# Patient Record
Sex: FEMALE | Race: WHITE | URBAN - METROPOLITAN AREA
[De-identification: names, ages, dates, MRNs, and addresses within clinical notes are randomized per-mention and may not be internally consistent; named-entity substitution may affect disease eponyms.]

---

## 2017-11-15 DIAGNOSIS — F41.1 GAD (GENERALIZED ANXIETY DISORDER): ICD-10-CM

## 2017-11-15 RX ORDER — SERTRALINE HYDROCHLORIDE 50 MG/1
50 TABLET, FILM COATED ORAL DAILY
Qty: 60 TAB | Refills: 0 | Status: SHIPPED | OUTPATIENT
Start: 2017-11-15 | End: 2018-01-04 | Stop reason: SDUPTHER

## 2017-11-15 RX ORDER — SERTRALINE HYDROCHLORIDE 50 MG/1
50 TABLET, FILM COATED ORAL DAILY
Qty: 60 TAB | Refills: 0 | Status: SHIPPED | OUTPATIENT
Start: 2017-11-15 | End: 2017-11-15 | Stop reason: SDUPTHER

## 2017-11-15 NOTE — TELEPHONE ENCOUNTER
From: Alcides Swenson  To: Park Díaz DO  Sent: 11/15/2017 10:29 AM EST  Subject: Medication Renewal Request    Original authorizing provider: DO Alcides Rose would like a refill of the following medications:  sertraline (ZOLOFT) 50 mg tablet Park Díaz DO]    Preferred pharmacy: [place] [id number]     Comment:  Sertraline 50 mg 1 1/2 tablets daily

## 2018-01-04 ENCOUNTER — OFFICE VISIT (OUTPATIENT)
Dept: FAMILY MEDICINE CLINIC | Age: 28
End: 2018-01-04

## 2018-01-04 VITALS
HEIGHT: 64 IN | WEIGHT: 144.4 LBS | BODY MASS INDEX: 24.65 KG/M2 | DIASTOLIC BLOOD PRESSURE: 68 MMHG | OXYGEN SATURATION: 99 % | TEMPERATURE: 96.3 F | RESPIRATION RATE: 14 BRPM | SYSTOLIC BLOOD PRESSURE: 106 MMHG | HEART RATE: 77 BPM

## 2018-01-04 DIAGNOSIS — Z23 ENCOUNTER FOR IMMUNIZATION: ICD-10-CM

## 2018-01-04 DIAGNOSIS — Z13.220 LIPID SCREENING: ICD-10-CM

## 2018-01-04 DIAGNOSIS — Z00.00 PREVENTATIVE HEALTH CARE: Primary | ICD-10-CM

## 2018-01-04 DIAGNOSIS — F41.9 CHRONIC ANXIETY: ICD-10-CM

## 2018-01-04 DIAGNOSIS — F41.1 GAD (GENERALIZED ANXIETY DISORDER): ICD-10-CM

## 2018-01-04 DIAGNOSIS — F33.40 RECURRENT DEPRESSIVE DISORDER, CURRENTLY IN REMISSION (HCC): ICD-10-CM

## 2018-01-04 PROBLEM — F33.9 RECURRENT DEPRESSION (HCC): Status: RESOLVED | Noted: 2018-01-04 | Resolved: 2018-01-04

## 2018-01-04 PROBLEM — G44.229 CHRONIC TENSION-TYPE HEADACHE, NOT INTRACTABLE: Status: ACTIVE | Noted: 2018-01-04

## 2018-01-04 PROBLEM — F33.9 RECURRENT DEPRESSION (HCC): Status: ACTIVE | Noted: 2018-01-04

## 2018-01-04 RX ORDER — SERTRALINE HYDROCHLORIDE 50 MG/1
50 TABLET, FILM COATED ORAL DAILY
Qty: 90 TAB | Refills: 4 | Status: SHIPPED | OUTPATIENT
Start: 2018-01-04 | End: 2018-03-14 | Stop reason: DRUGHIGH

## 2018-01-04 NOTE — PATIENT INSTRUCTIONS

## 2018-01-04 NOTE — PROGRESS NOTES
1. Have you been to the ER, urgent care clinic since your last visit? Hospitalized since your last visit? No    2. Have you seen or consulted any other health care providers outside of the [address] [place] [address] since your last visit? Include any pap smears or colon screening. Chiropractor-Avtar Prince-about twice a week- to help with shoulder and neck pain. Counselor Robert Jorge 1/3/2018. GYN Dr. Riya Bolden with [facility]- pap smear done a couple of months ago and it was normal.     Chief Complaint   Patient presents with    Complete Physical     Not fasting      Patient presents for routine immunizations. Patient denies any symptoms , reactions or allergies that would exclude them from being immunized today. After obtaining written consent, and per verbal orders of Dr. Aden Christensen, injection of influenza given. Risks and adverse reactions were discussed and the VIS was given to them. All questions were addressed. Patient was observed for 15 minutes post injection. There were no reactions observed at this time.  Advised patient to call with any concerns or signs and symptoms of adverse reaction.      Richie Ricardo LPN

## 2018-01-04 NOTE — MR AVS SNAPSHOT
Visit Information Date & Time Provider Department Dept. Phone Encounter #  
 1/4/2018  1:00 PM Richard Wang [facility] [phone] [id number] Follow-up Instructions Return in about 1 year (around 1/4/2019) for Full Physical.  
  
Upcoming Health Maintenance Date Due  
 PAP AKA CERVICAL CYTOLOGY 3/26/2018 DTaP/Tdap/Td series (3 - Td) 4/14/2027 Allergies as of 1/4/2018  Review Complete On: 1/4/2018 By: Richard Wang MD  
 No Known Allergies Current Immunizations  Reviewed on 12/29/2017 Name Date HPV 1/1/2007 Hep B Vaccine 1/1/2001 Influenza Vaccine (Quad) PF  Incomplete MMR 1/1/1994 Poliovirus vaccine 1/1/1993 Td 1/1/2006 Tdap 4/14/2017 Not reviewed this visit You Were Diagnosed With   
  
 Codes Comments Preventative health care    -  Primary ICD-10-CM: Z00.00 ICD-9-CM: V70.0 Lipid screening     ICD-10-CM: I38.960 ICD-9-CM: V77.91 Chronic anxiety     ICD-10-CM: F41.9 ICD-9-CM: 300.00 Recurrent depressive disorder, currently in remission [facility])     ICD-10-CM: F33.40 ICD-9-CM: 296.35 Encounter for immunization     ICD-10-CM: D26 ICD-9-CM: V03.89 JAY (generalized anxiety disorder)     ICD-10-CM: F41.1 ICD-9-CM: 300.02 Vitals BP Pulse Temp Resp Height(growth percentile) Weight(growth percentile) 106/68 (BP 1 Location: Left arm, BP Patient Position: Sitting) 77 96.3 °F (35.7 °C) (Oral) 14 5' 4\" (1.626 m) 144 lb 6.4 oz (65.5 kg) LMP SpO2 BMI OB Status Smoking Status 12/27/2017 99% 24.79 kg/m2 Having regular periods Never Smoker Vitals History BMI and BSA Data Body Mass Index Body Surface Area 24.79 kg/m 2 1.72 m 2 Preferred Pharmacy Pharmacy Name Phone [id number] [place] [address] [phone] Your Updated Medication List  
  
   
 This list is accurate as of: 1/4/18  1:43 PM.  Always use your most recent med list. ADVIL PO Take  by mouth. PRENATAL 19 PO Take  by mouth daily. sertraline 50 mg tablet Commonly known as:  ZOLOFT Take 1 Tab by mouth daily. Prescriptions Sent to Pharmacy Refills  
 sertraline (ZOLOFT) 50 mg tablet 4 Sig: Take 1 Tab by mouth daily. Class: Normal  
 Pharmacy: [id number]- [place], [address] Ph #: [phone] Route: Oral  
  
We Performed the Following INFLUENZA VIRUS VAC QUAD,SPLIT,PRESV FREE SYRINGE IM [id number] CPT(R)] LIPID PANEL [04016 CPT(R)] TSH 3RD GENERATION [62357 CPT(R)] Follow-up Instructions Return in about 1 year (around 1/4/2019) for Full Physical.  
  
  
Patient Instructions Well Visit, Ages 25 to 48: Care Instructions Your Care Instructions Physical exams can help you stay healthy. Your doctor has checked your overall health and may have suggested ways to take good care of yourself. He or she also may have recommended tests. At home, you can help prevent illness with healthy eating, regular exercise, and other steps. Follow-up care is a key part of your treatment and safety. Be sure to make and go to all appointments, and call your doctor if you are having problems. It's also a good idea to know your test results and keep a list of the medicines you take. How can you care for yourself at home? · Reach and stay at a healthy weight. This will lower your risk for many problems, such as obesity, diabetes, heart disease, and high blood pressure. · Get at least 30 minutes of physical activity on most days of the week. Walking is a good choice. You also may want to do other activities, such as running, swimming, cycling, or playing tennis or team sports. Discuss any changes in your exercise program with your doctor. · Do not smoke or allow others to smoke around you. If you need help quitting, talk to your doctor about stop-smoking programs and medicines. These can increase your chances of quitting for good. · Talk to your doctor about whether you have any risk factors for sexually transmitted infections (STIs). Having one sex partner (who does not have STIs and does not have sex with anyone else) is a good way to avoid these infections. · Use birth control if you do not want to have children at this time. Talk with your doctor about the choices available and what might be best for you. · Protect your skin from too much sun. When you're outdoors from 10 a.m. to 4 p.m., stay in the shade or cover up with clothing and a hat with a wide brim. Wear sunglasses that block UV rays. Even when it's cloudy, put broad-spectrum sunscreen (SPF 30 or higher) on any exposed skin. · See a dentist one or two times a year for checkups and to have your teeth cleaned. · Wear a seat belt in the car. · Drink alcohol in moderation, if at all. That means no more than 2 drinks a day for men and 1 drink a day for women. Follow your doctor's advice about when to have certain tests. These tests can spot problems early. For everyone · Cholesterol. Have the fat (cholesterol) in your blood tested after age 21. Your doctor will tell you how often to have this done based on your age, family history, or other things that can increase your risk for heart disease. · Blood pressure. Have your blood pressure checked during a routine doctor visit. Your doctor will tell you how often to check your blood pressure based on your age, your blood pressure results, and other factors. · Vision. Talk with your doctor about how often to have a glaucoma test. 
· Diabetes. Ask your doctor whether you should have tests for diabetes. · Colon cancer. Have a test for colon cancer at age 48.  You may have one of several tests. If you are younger than 48, you may need a test earlier if you have any risk factors. Risk factors include whether you already had a precancerous polyp removed from your colon or whether your parent, brother, sister, or child has had colon cancer. For women · Breast exam and mammogram. Talk to your doctor about when you should have a clinical breast exam and a mammogram. Medical experts differ on whether and how often women under 50 should have these tests. Your doctor can help you decide what is right for you. · Pap test and pelvic exam. Begin Pap tests at age 24. A Pap test is the best way to find cervical cancer. The test often is part of a pelvic exam. Ask how often to have this test. 
· Tests for sexually transmitted infections (STIs). Ask whether you should have tests for STIs. You may be at risk if you have sex with more than one person, especially if your partners do not wear condoms. For men · Tests for sexually transmitted infections (STIs). Ask whether you should have tests for STIs. You may be at risk if you have sex with more than one person, especially if you do not wear a condom. · Testicular cancer exam. Ask your doctor whether you should check your testicles regularly. · Prostate exam. Talk to your doctor about whether you should have a blood test (called a PSA test) for prostate cancer. Experts differ on whether and when men should have this test. Some experts suggest it if you are older than 39 and are -American or have a father or brother who got prostate cancer when he was younger than 72. When should you call for help? Watch closely for changes in your health, and be sure to contact your doctor if you have any problems or symptoms that concern you. Where can you learn more? Go to http://suellen-francois.info/. Enter P072 in the search box to learn more about \"Well Visit, Ages 25 to 48: Care Instructions. \" Current as of: May 12, 2017 Content Version: 11.4 © 2474-0220 [employer]. Care instructions adapted under license by [employer] (which disclaims liability or warranty for this information). If you have questions about a medical condition or this instruction, always ask your healthcare professional. [address] any warranty or liability for your use of this information. Light therapy for mood disorders For seasonal depression, use 30-60 minutes daily in the morning For bipolar depression, start with only 10 minutes and gradually work up to 60 minutes between noon-2:30pm 
 
7000-10,000 LUX are best, with a UV blocker 
 
[employer]/Aura%C2%AE-Therapy-Bright-Adjustable-Warranty/dp/[email or web address]/ref=sr_1_2_sspa?ie=UTF8&kiy=6581600157&sr=8-2-spons&keywords=nature+bright+sun+touch+light+therapy&psc=1 [facility] SERVICES! Dear Lola Lehman: Thank you for requesting a [employer] account. Our records indicate that you already have an active [employer] account. You can access your account anytime at [email or web address] Did you know that you can access your hospital and ER discharge instructions at any time in [employer]? You can also review all of your test results from your hospital stay or ER visit. Additional Information If you have questions, please visit the Frequently Asked Questions section of the [employer] website at [email or web address] Remember, [employer] is NOT to be used for urgent needs. For medical emergencies, dial 911. Now available from your iPhone and Android! Please provide this summary of care documentation to your next provider. Your primary care clinician is listed as Bismark BARNEY. If you have any questions after today's visit, please call [phone].

## 2018-01-04 NOTE — PROGRESS NOTES
Wesly Melendez [address] [facility]. [place], [address]  [phone]             Date of visit: 1/4/2018   Subjective:      History obtained from:  the patient. Andrew Carpio is a 28 y.o. female who presents today for physical    Long history of anxiety, started as depression in college but more anxious in recent years even with panic attacks  On zoloft for 2.5 years works well  Weaned it very slowly 1.5 years ago over the summer but did not do well, had panic attacks. Feels like it works very well but doesn't like that she has gained 15lb since being on it. Sees therapist monthly, and that is very helpful    Trying to eat better, became a pescetarian last spring  Stopped most fast food and fried foods  Maybe a little more pasta than she used to but trying not to do excessive  Sometimes soda  No etoh, mostly water      Sees chiropractor and massage therapist for neck tension. Recurrent depression but mostly has changed    Has 2 dogs she enjoys    Has had vertigo in the past, motion sickness when on a boat but dramamine works. Not planning any international travel but going to [place] this summer    Tries to wear sunscreen. Had precancerous cells on nose with the cream and it worked. Will follow up with derm for full skin check    Saw a GI doc this past year but stomach doing well since eating better.     Occasional advil for tension headaches (tylenol if question of pregnancy)  Had been trying to get pregnant for 6 mo, now taking a break but not on birth control  Has seen gyn but has not yet had preconception genetic testing  On PNV    Mild dizziness lasting only a moment at times, getting better    Patient Active Problem List    Diagnosis Date Noted    Chronic tension-type headache, not intractable 01/04/2018    JAY (generalized anxiety disorder) 01/04/2018    Recurrent depressive disorder, currently in remission ([facility] [address] 01/04/2018    Actinic keratosis 11/08/2016    Motion sickness 06/12/2012    Equilibrium disorder 06/12/2012    Persistent disorder of initiating or maintaining sleep 06/12/2012     Current Outpatient Prescriptions   Medication Sig Dispense Refill    PRENATAL /IRON/FOLIC ACID (PRENATAL 19 PO) Take  by mouth daily.  sertraline (ZOLOFT) 50 mg tablet Take 1 Tab by mouth daily. 90 Tab 4    IBUPROFEN (ADVIL PO) Take  by mouth. No Known Allergies  Past Medical History:   Diagnosis Date    Depression     JAY (generalized anxiety disorder)     Generalized headaches      Past Surgical History:   Procedure Laterality Date    HX WISDOM TEETH EXTRACTION  2008     Family History   Problem Relation Age of Onset    Colon Cancer Maternal Grandmother     Depression Father     Depression Sister     Hypertension Paternal Grandfather     Stroke Paternal Grandfather     Arthritis-rheumatoid Mother     Stroke Maternal Grandfather      Social History   Substance Use Topics    Smoking status: Never Smoker    Smokeless tobacco: Never Used    Alcohol use No      Social History     Social History Narrative    Works as school psychologist     to [employer] doing school psychology work        Review of Systems  Card: denies chest pain or palpitations  Neuro: admits to occasional stress headaches when working  Skin: denies lesions  GI denies abd symptoms with diet improvements  Pulm: denies shortness of breath   Psych: denies suicidal ideation        Objective:     Vitals:    01/04/18 1312   BP: 106/68   Pulse: 77   Resp: 14   Temp: 96.3 °F (35.7 °C)   TempSrc: Oral   SpO2: 99%   Weight: 144 lb 6.4 oz (65.5 kg)   Height: 5' 4\" (1.626 m)     Body mass index is 24.79 kg/(m^2).      General: stated age, well-developed, and in NAD  Eyes: PERRL, EOMI, no redness or drainage  Nose: no drainage  Mouth: no lesions  Throat: no erythema, exudate or swelling  Neck: supple, symmetrical, trachea midline, no adenopathy and thyroid: not enlarged, symmetric, no tenderness/mass/nodules  Lungs:  clear to auscultation w/o rales, rhonchi, wheezes w/normal effort and no use of accessory muscles of respiration   Heart: regular rate and rhythm, S1, S2 normal, no murmur, click, rub or gallop  Abdomen: soft, nontender, no masses  Ext:  No edema noted. Lymph: no cervical adenopathy appreciated  Skin:  Normal. and no rash or abnormalities   Neuro: normal gait, CN 2-12 intact  Psych: alert and oriented to person, place, time and situation and Speech: appropriate quality, quantity and organization of sentences, normal affect  Assessment/Plan:       ICD-10-CM ICD-9-CM    1. Preventative health care Z00.00 V70.0    2. Lipid screening Z13.220 V77.91 TSH 3RD GENERATION   3. Chronic anxiety F41.9 300.00 LIPID PANEL   4. [facility] F33.40 296.35    5. Encounter for immunization Z23 V03.89 INFLUENZA VIRUS VAC QUAD,SPLIT,PRESV FREE SYRINGE IM   6. JAY (generalized anxiety disorder) F41.1 300.02 sertraline (ZOLOFT) 50 mg tablet        Orders Placed This Encounter    Influenza virus vaccine (QUADRIVALENT PRES FREE SYRINGE) IM (42807)    LIPID PANEL    TSH 3RD GENERATION    PRENATAL /IRON/FOLIC ACID (PRENATAL 19 PO)    sertraline (ZOLOFT) 50 mg tablet       Very healthy overall  Eats well-encouraged her to keep up the good work  Encouraged more regular exercise    Discussed weaning zoloft or changing it  Didn't recommend it as her symptoms are recurrent she is high risk for another recurrence  This is not ideal time of year for wean  Also she is hoping to get pregnant this year, is off birth control, so zoloft would be best med for that, may not be best time to switch. She will discuss with her counselor and let me know if wanting to wean to 25mg. Discussed that exercise may also help  Reassured about her weight    Discussed the diagnosis and plan and she expressed understanding.     Follow-up Disposition:  Return in about 1 year (around 1/4/2019) for Full June West MD

## 2018-01-05 LAB
CHOLEST SERPL-MCNC: 140 MG/DL (ref 100–199)
HDLC SERPL-MCNC: 53 MG/DL
INTERPRETATION, 910389: NORMAL
LDLC SERPL CALC-MCNC: 73 MG/DL (ref 0–99)
TRIGL SERPL-MCNC: 69 MG/DL (ref 0–149)
TSH SERPL DL<=0.005 MIU/L-ACNC: 1.18 UIU/ML (ref 0.45–4.5)
VLDLC SERPL CALC-MCNC: 14 MG/DL (ref 5–40)

## 2018-01-29 ENCOUNTER — PATIENT MESSAGE (OUTPATIENT)
Dept: FAMILY MEDICINE CLINIC | Age: 28
End: 2018-01-29

## 2018-01-29 NOTE — TELEPHONE ENCOUNTER
From: Analilia Calderon  To: Jass Shelton MD  Sent: 1/29/2018 11:29 AM EST  Subject: Visit Adriano Rivera,    I was feeling anxious, panicky, overwhelmed, and exhausted last week due to a number of stressors and increased my Sertraline dosage from 50 mg back to 75 mg on Thursday, 1/25. I reached out to my counselor today to see if we could talk sooner than our previously scheduled appointment (2/7). I wanted to get in touch with you to see if you have any advice regarding the dosage increase. How long would you expect it to take for me to notice effects of the increase?     Thank you,  Jairon Norman

## 2018-03-13 ENCOUNTER — PATIENT MESSAGE (OUTPATIENT)
Dept: FAMILY MEDICINE CLINIC | Age: 28
End: 2018-03-13

## 2018-03-14 RX ORDER — SERTRALINE HYDROCHLORIDE 100 MG/1
100 TABLET, FILM COATED ORAL DAILY
Qty: 90 TAB | Refills: 0 | Status: SHIPPED | OUTPATIENT
Start: 2018-03-14 | End: 2018-06-29 | Stop reason: SDUPTHER

## 2018-03-14 NOTE — TELEPHONE ENCOUNTER
From: Viri Foster  To: Mayra Higgins MD  Sent: 3/13/2018 7:30 PM EDT  Subject: Prescription Question    Hi Dr. Pasco Cooks,    The dosage increase from 50 to 75 mg in February definitely helped with my symptoms, especially the physiological symptoms. However, I'm feeling like it's leveling off and I'm continuing to feel a little more down than I'd like, and also still having some anxiety surrounding work. I'm wondering if you'd advise increasing to 100 mg? I have never taken more than 75, but feel like my current dosage might not be enough. Thanks!   Ryan Dunlap

## 2018-04-27 ENCOUNTER — OFFICE VISIT (OUTPATIENT)
Dept: FAMILY MEDICINE CLINIC | Age: 28
End: 2018-04-27

## 2018-04-27 VITALS
SYSTOLIC BLOOD PRESSURE: 111 MMHG | WEIGHT: 135 LBS | HEART RATE: 80 BPM | HEIGHT: 64 IN | TEMPERATURE: 97.9 F | RESPIRATION RATE: 18 BRPM | DIASTOLIC BLOOD PRESSURE: 78 MMHG | BODY MASS INDEX: 23.05 KG/M2 | OXYGEN SATURATION: 99 %

## 2018-04-27 DIAGNOSIS — R23.3 EASY BRUISING: ICD-10-CM

## 2018-04-27 DIAGNOSIS — F33.40 RECURRENT DEPRESSIVE DISORDER, CURRENTLY IN REMISSION (HCC): ICD-10-CM

## 2018-04-27 DIAGNOSIS — F41.0 PANIC ATTACKS: ICD-10-CM

## 2018-04-27 DIAGNOSIS — L59.0 ERYTHEMA AB IGNE: ICD-10-CM

## 2018-04-27 DIAGNOSIS — R19.7 DIARRHEA, UNSPECIFIED TYPE: Primary | ICD-10-CM

## 2018-04-27 DIAGNOSIS — F41.1 GAD (GENERALIZED ANXIETY DISORDER): ICD-10-CM

## 2018-04-27 RX ORDER — OMEPRAZOLE 10 MG/1
20 CAPSULE, DELAYED RELEASE ORAL DAILY
COMMUNITY

## 2018-04-27 RX ORDER — DIAZEPAM 5 MG/1
5 TABLET ORAL
COMMUNITY

## 2018-04-27 RX ORDER — ONDANSETRON 4 MG/1
4 TABLET, ORALLY DISINTEGRATING ORAL
Qty: 20 TAB | Refills: 1 | Status: SHIPPED | OUTPATIENT
Start: 2018-04-27 | End: 2018-12-17 | Stop reason: SDUPTHER

## 2018-04-27 NOTE — MR AVS SNAPSHOT
[address] [place] [address] 
[phone] Patient: Giuseppe Reyes MRN: [id number] MMB:3[birth year]48[birth year]106 Visit Information Date & Time Provider Department Dept. Phone Encounter #  
 4/27/2018 12:30 PM Bjorn Braga, [address] [facility] [phone] [id number] Follow-up Instructions Return if symptoms worsen or fail to improve. Upcoming Health Maintenance Date Due  
 PAP AKA CERVICAL CYTOLOGY 3/26/2018 Influenza Age 5 to Adult 8/1/2018 DTaP/Tdap/Td series (3 - Td) 4/14/2027 Allergies as of 4/27/2018  Review Complete On: 4/27/2018 By: Bjorn Braga MD  
 No Known Allergies Current Immunizations  Reviewed on 12/29/2017 Name Date HPV 1/1/2007 Hep B Vaccine 1/1/2001 Influenza Vaccine (Quad) PF 1/4/2018 MMR 1/1/1994 Poliovirus vaccine 1/1/1993 Td 1/1/2006 Tdap 4/14/2017 Not reviewed this visit You Were Diagnosed With   
  
 Codes Comments Diarrhea, unspecified type    -  Primary ICD-10-CM: R19.7 ICD-9-CM: 787.91 Panic attacks     ICD-10-CM: F41.0 ICD-9-CM: 300.01   
 JAY (generalized anxiety disorder)     ICD-10-CM: F41.1 ICD-9-CM: 300.02 Recurrent depressive disorder, currently in remission [facility])     ICD-10-CM: F33.40 ICD-9-CM: 296.35 Erythema ab igne     ICD-10-CM: L59.0 ICD-9-CM: 949.1 Easy bruising     ICD-10-CM: R23.8 ICD-9-CM: 978. 9 Vitals BP Pulse Temp Resp Height(growth percentile) Weight(growth percentile) 111/78 (BP 1 Location: Left arm, BP Patient Position: Sitting) 80 97.9 °F (36.6 °C) (Oral) 18 5' 4\" (1.626 m) 135 lb (61.2 kg) LMP SpO2 BMI OB Status Smoking Status 04/19/2018 (Approximate) 99% 23.17 kg/m2 Having regular periods Never Smoker Vitals History BMI and BSA Data Body Mass Index Body Surface Area  
 23.17 kg/m 2 1.66 m 2 Preferred Pharmacy Pharmacy Name Phone [facility][id number]- Shira May [facility] [phone] Your Updated Medication List  
  
   
This list is accurate as of 4/27/18  1:23 PM.  Always use your most recent med list.  
  
  
  
  
 diazePAM 5 mg tablet Commonly known as:  VALIUM Take 5 mg by mouth every six (6) hours as needed for Anxiety. Taking 1/2 tab 2.5 mg tab q night x last 3 nights  
  
 omeprazole 10 mg capsule Commonly known as:  PRILOSEC Take 20 mg by mouth daily. ondansetron 4 mg disintegrating tablet Commonly known as:  ZOFRAN ODT Take 1 Tab by mouth every eight (8) hours as needed for Nausea. PRENATAL 19 PO Take  by mouth daily. sertraline 100 mg tablet Commonly known as:  ZOLOFT Take 1 Tab by mouth daily. Increased dose Prescriptions Sent to Pharmacy Refills  
 ondansetron (ZOFRAN ODT) 4 mg disintegrating tablet 1 Sig: Take 1 Tab by mouth every eight (8) hours as needed for Nausea. Class: Normal  
 [id number] [address] [place] [address] Ph #: [phone] Route: Oral  
  
We Performed the Following LACTOFERRIN, FECAL [id number] CPT(R)] Follow-up Instructions Return if symptoms worsen or fail to improve. Patient Instructions Recovering From Depression: Care Instructions Your Care Instructions Taking good care of yourself is important as you recover from depression. In time, your symptoms will fade as your treatment takes hold. Do not give up. Instead, focus your energy on getting better. Your mood will improve. It just takes some time. Focus on things that can help you feel better, such as being with friends and family, eating well, and getting enough rest. But take things slowly. Do not do too much too soon. You will begin to feel better gradually. Follow-up care is a key part of your treatment and safety.  Be sure to make and go to all appointments, and call your doctor if you are having problems. It's also a good idea to know your test results and keep a list of the medicines you take. How can you care for yourself at home? Be realistic · If you have a large task to do, break it up into smaller steps you can handle, and just do what you can. · You may want to put off important decisions until your depression has lifted. If you have plans that will have a major impact on your life, such as marriage, divorce, or a job change, try to wait a bit. Talk it over with friends and loved ones who can help you look at the overall picture first. 
· Reaching out to people for help is important. Do not isolate yourself. Let your family and friends help you. Find someone you can trust and confide in, and talk to that person. · Be patient, and be kind to yourself. Remember that depression is not your fault and is not something you can overcome with willpower alone. Treatment is necessary for depression, just like for any other illness. Feeling better takes time, and your mood will improve little by little. Stay active · Stay busy and get outside. Take a walk, or try some other light exercise. · Talk with your doctor about an exercise program. Exercise can help with mild depression. · Go to a movie or concert. Take part in a Yazidi activity or other social gathering. Go to a ball game. · Ask a friend to have dinner with you. Take care of yourself · Eat a balanced diet with plenty of fresh fruits and vegetables, whole grains, and lean protein. If you have lost your appetite, eat small snacks rather than large meals. · Avoid drinking alcohol or using illegal drugs. Do not take medicines that have not been prescribed for you. They may interfere with medicines you may be taking for depression, or they may make your depression worse. · Take your medicines exactly as they are prescribed.  You may start to feel better within 1 to 3 weeks of taking antidepressant medicine. But it can take as many as 6 to 8 weeks to see more improvement. If you have questions or concerns about your medicines, or if you do not notice any improvement by 3 weeks, talk to your doctor. · If you have any side effects from your medicine, tell your doctor. Antidepressants can make you feel tired, dizzy, or nervous. Some people have dry mouth, constipation, headaches, sexual problems, or diarrhea. Many of these side effects are mild and will go away on their own after you have been taking the medicine for a few weeks. Some may last longer. Talk to your doctor if side effects are bothering you too much. You might be able to try a different medicine. · Get enough sleep. If you have problems sleeping: ¨ Go to bed at the same time every night, and get up at the same time every morning. ¨ Keep your bedroom dark and quiet. ¨ Do not exercise after 5:00 p.m. ¨ Avoid drinks with caffeine after 5:00 p.m. · Avoid sleeping pills unless they are prescribed by the doctor treating your depression. Sleeping pills may make you groggy during the day, and they may interact with other medicine you are taking. · If you have any other illnesses, such as diabetes, heart disease, or high blood pressure, make sure to continue with your treatment. Tell your doctor about all of the medicines you take, including those with or without a prescription. · Keep the numbers for these national suicide hotlines: 4-723-545-TALK (6-608-635-956.213.7496) and 9-193-PJSELNB (4-054-242-117.603.2986). If you or someone you know talks about suicide or feeling hopeless, get help right away. When should you call for help? Call 911 anytime you think you may need emergency care. For example, call if: 
? · You feel like hurting yourself or someone else. ? · Someone you know has depression and is about to attempt or is attempting suicide. ?Call your doctor now or seek immediate medical care if: ? · You hear voices. ? · Someone you know has depression and: 
¨ Starts to give away his or her possessions. ¨ Uses illegal drugs or drinks alcohol heavily. ¨ Talks or writes about death, including writing suicide notes or talking about guns, knives, or pills. ¨ Starts to spend a lot of time alone. ¨ Acts very aggressively or suddenly appears calm. ? Watch closely for changes in your health, and be sure to contact your doctor if: 
? · You do not get better as expected. Where can you learn more? Go to http://suellen-francois.info/. Enter L097 in the search box to learn more about \"Recovering From Depression: Care Instructions. \" Current as of: May 12, 2017 Content Version: 11.4 © 8070-7655 [employer]. Care instructions adapted under license by [employer] (which disclaims liability or warranty for this information). If you have questions about a medical condition or this instruction, always ask your healthcare professional. [place] [address] any warranty or liability for your use of this information. Introducing [facility]! Dear Gracia Boyer: Thank you for requesting a Kmsocial account. Our records indicate that you already have an active Kmsocial account. You can access your account anytime at [email or web address] Did you know that you can access your hospital and ER discharge instructions at any time in Kmsocial? You can also review all of your test results from your hospital stay or ER visit. Additional Information If you have questions, please visit the Frequently Asked Questions section of the Kmsocial website at [email or web address] Remember, Kmsocial is NOT to be used for urgent needs. For medical emergencies, dial 911. Now available from your iPhone and Android! Please provide this summary of care documentation to your next provider. Your primary care clinician is listed as Elizabeth Hope. If you have any questions after today's visit, please call [phone].

## 2018-04-27 NOTE — PATIENT INSTRUCTIONS
Recovering From Depression: Care Instructions  Your Care Instructions    Taking good care of yourself is important as you recover from depression. In time, your symptoms will fade as your treatment takes hold. Do not give up. Instead, focus your energy on getting better. Your mood will improve. It just takes some time. Focus on things that can help you feel better, such as being with friends and family, eating well, and getting enough rest. But take things slowly. Do not do too much too soon. You will begin to feel better gradually. Follow-up care is a key part of your treatment and safety. Be sure to make and go to all appointments, and call your doctor if you are having problems. It's also a good idea to know your test results and keep a list of the medicines you take. How can you care for yourself at home? Be realistic  · If you have a large task to do, break it up into smaller steps you can handle, and just do what you can. · You may want to put off important decisions until your depression has lifted. If you have plans that will have a major impact on your life, such as marriage, divorce, or a job change, try to wait a bit. Talk it over with friends and loved ones who can help you look at the overall picture first.  · Reaching out to people for help is important. Do not isolate yourself. Let your family and friends help you. Find someone you can trust and confide in, and talk to that person. · Be patient, and be kind to yourself. Remember that depression is not your fault and is not something you can overcome with willpower alone. Treatment is necessary for depression, just like for any other illness. Feeling better takes time, and your mood will improve little by little. Stay active  · Stay busy and get outside. Take a walk, or try some other light exercise. · Talk with your doctor about an exercise program. Exercise can help with mild depression. · Go to a movie or concert.  Take part in a Roman Catholic activity or other social gathering. Go to a OpenCounter game. · Ask a friend to have dinner with you. Take care of yourself  · Eat a balanced diet with plenty of fresh fruits and vegetables, whole grains, and lean protein. If you have lost your appetite, eat small snacks rather than large meals. · Avoid drinking alcohol or using illegal drugs. Do not take medicines that have not been prescribed for you. They may interfere with medicines you may be taking for depression, or they may make your depression worse. · Take your medicines exactly as they are prescribed. You may start to feel better within 1 to 3 weeks of taking antidepressant medicine. But it can take as many as 6 to 8 weeks to see more improvement. If you have questions or concerns about your medicines, or if you do not notice any improvement by 3 weeks, talk to your doctor. · If you have any side effects from your medicine, tell your doctor. Antidepressants can make you feel tired, dizzy, or nervous. Some people have dry mouth, constipation, headaches, sexual problems, or diarrhea. Many of these side effects are mild and will go away on their own after you have been taking the medicine for a few weeks. Some may last longer. Talk to your doctor if side effects are bothering you too much. You might be able to try a different medicine. · Get enough sleep. If you have problems sleeping:  ¨ Go to bed at the same time every night, and get up at the same time every morning. ¨ Keep your bedroom dark and quiet. ¨ Do not exercise after 5:00 p.m. ¨ Avoid drinks with caffeine after 5:00 p.m. · Avoid sleeping pills unless they are prescribed by the doctor treating your depression. Sleeping pills may make you groggy during the day, and they may interact with other medicine you are taking. · If you have any other illnesses, such as diabetes, heart disease, or high blood pressure, make sure to continue with your treatment.  Tell your doctor about all of the medicines you take, including those with or without a prescription. · Keep the numbers for these national suicide hotlines: 5-928-024-TALK (9-819.409.3649) and 4-574-SWBYKFO (6-282.356.4192). If you or someone you know talks about suicide or feeling hopeless, get help right away. When should you call for help? Call 911 anytime you think you may need emergency care. For example, call if:  ? · You feel like hurting yourself or someone else. ? · Someone you know has depression and is about to attempt or is attempting suicide. ?Call your doctor now or seek immediate medical care if:  ? · You hear voices. ? · Someone you know has depression and:  ¨ Starts to give away his or her possessions. ¨ Uses illegal drugs or drinks alcohol heavily. ¨ Talks or writes about death, including writing suicide notes or talking about guns, knives, or pills. ¨ Starts to spend a lot of time alone. ¨ Acts very aggressively or suddenly appears calm. ? Watch closely for changes in your health, and be sure to contact your doctor if:  ? · You do not get better as expected. Where can you learn more? Go to http://suellen-francois.info/. Enter V339 in the search box to learn more about \"Recovering From Depression: Care Instructions. \"  Current as of: May 12, 2017  Content Version: 11.4  © 0771-6062 [employer]. Care instructions adapted under license by [employer] (which disclaims liability or warranty for this information). If you have questions about a medical condition or this instruction, always ask your healthcare professional. [address] any warranty or liability for your use of this information.

## 2018-04-27 NOTE — PROGRESS NOTES
Chief Complaint   Patient presents with    Anxiety     follow up       Reviewed Record in preparation for visit and have obtained necessary documentation. Identified pt with two pt identifiers (Name @ )    Health Maintenance Due   Topic    PAP AKA CERVICAL CYTOLOGY          1. Have you been to the ER, urgent care clinic since your last visit? Hospitalized since your last visit? No    2. Have you seen or consulted any other health care providers outside of the [address] [place] [address] since your last visit? Include any pap smears or colon screening.  No

## 2018-04-27 NOTE — PROGRESS NOTES
Wesly Melendez [address] [place] [address] Ros[place]. [address]  [phone]             Date of visit: 4/27/18   Subjective:      History obtained from:  the patient. Bay Triana is a 28 y.o. female who presents today for f/u chronic problems    Will feel nauseated and have diarrhea all night about 4x in past 2 months. Might happen after a fatty meal or when she knows she is stressed. Saw blood once when she wiped, thinks was from hemorrhoid,was not mixed in the stool. Has had an anxious year, preparing for a move  Sometimes feels like she is having a panic attack  Hard to relax  No free time  Moving within town on Monday, so going to be over soon  Staying with his parents for a few nights and last night was better    Had to take valium the past 3 nights to settle down  Heart races, feels out of control can't catch her breath      Has seen GI in past, checked thyroid, celiac, some vitamins, everything was normal.  Going to see them again soon. Increasing zoloft increased her drive and motivation  Maybe a little depressed but not much  Likes her therapist she has seen for 7 months. Meets at park and walks around [address] [facility] or talk on phone    Rash on abdomen where she was using heating pad for the nausea/stomach aches that went along with diarrhea.     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  diazePAM (VALIUM) 5 mg tablet Take 5 mg by mouth every six (6) hours as needed for Anxiety. Taking 1/2 tab 2.5 mg tab q night x last 3 nights      ondansetron (ZOFRAN ODT) 4 mg disintegrating tablet Take 1 Tab by mouth every eight (8) hours as needed for Nausea. 20 Tab 1    sertraline (ZOLOFT) 100 mg tablet Take 1 Tab by mouth daily. Increased dose 90 Tab 0    PRENATAL /IRON/FOLIC ACID (PRENATAL 19 PO) Take  by mouth daily. No Known Allergies  Past Medical History:   Diagnosis Date    Depression     JAY (generalized anxiety disorder)     Generalized headaches      Past Surgical History:   Procedure Laterality Date    HX WISDOM TEETH EXTRACTION  2008     Family History   Problem Relation Age of Onset    Colon Cancer Maternal Grandmother     Depression Father     Depression Sister     Hypertension Paternal Grandfather     Stroke Paternal Grandfather     Arthritis-rheumatoid Mother     Stroke Maternal Grandfather      Social History   Substance Use Topics    Smoking status: Never Smoker    Smokeless tobacco: Never Used    Alcohol use No      Social History     Social History Narrative    Works as school psychologist     to [employer] doing school psychology work        Review of Systems  Psych: denies suicidal ideation  Gen: denies fever       Objective:     Vitals:    04/27/18 1230   BP: 111/78   Pulse: 80   Resp: 18   Temp: 97.9 °F (36.6 °C)   TempSrc: Oral   SpO2: 99%   Weight: 135 lb (61.2 kg)   Height: 5' 4\" (1.626 m)     Body mass index is 23.17 kg/(m^2).      General: stated age, well developed, well nourished and in NAD  Neck: supple, symmetrical, trachea midline, no adenopathy and thyroid: not enlarged, symmetric, no tenderness/mass/nodules  Lungs:  clear to auscultation w/o rales, rhonchi, wheezes w/normal effort and no use of accessory muscles of respiration Heart: regular rate and rhythm, S1, S2 normal, no murmur, click, rub or gallop  Abdomen: soft, nontender, no masses  Ext:  No edema noted. Lymph: no cervical adenopathy appreciated  Skin:  Reticular hyperpigmented patch on mid-abdomen; anterior thighs with numerous small non-palpable ecchymoses in shape of dog claws  Psych: alert and oriented to person, place, time and situation and Speech: appropriate quality, quantity and organization of sentences     Assessment/Plan:       ICD-10-CM ICD-9-CM    1. Diarrhea, unspecified type R19.7 787.91 LACTOFERRIN, FECAL   2. Panic attacks F41.0 300.01    3. JAY (generalized anxiety disorder) F41.1 300.02    4. [facility] F33.40 296.35    5. Erythema ab igne L59.0 949.1    6. Easy bruising R23.8 782.9         Orders Placed This Encounter    LACTOFERRIN, FECAL    omeprazole (PRILOSEC) 10 mg capsule    diazePAM (VALIUM) 5 mg tablet    ondansetron (ZOFRAN ODT) 4 mg disintegrating tablet       Will do stool test screening for IBD  She also plans to see GI soon  Could try zofran or imodium prn for symptoms  Will continue to work on chronic anxiety with counseling, exercise  No change to zoloft now; she is about to move, and I hope she will be feeling better when she gets through that  [facility] to use valium here and there but discussed risks; not a good long-term option. Will have to wait for erythema ab igne to go away, advised to keep it out of the sun  Go back on PNV in case of pregnancy  Discussed risks of various meds in pregnancy  Bruising on thighs not excessive    Discussed the diagnosis and plan and she expressed understanding. Follow-up Disposition:  Return if symptoms worsen or fail to improve.  asked her to keep me posted on anchor.travel and will see her if not doing well    Red Langley MD

## 2018-06-29 RX ORDER — SERTRALINE HYDROCHLORIDE 100 MG/1
100 TABLET, FILM COATED ORAL DAILY
Qty: 90 TAB | Refills: 0 | Status: SHIPPED | OUTPATIENT
Start: 2018-06-29

## 2018-06-29 NOTE — TELEPHONE ENCOUNTER
Pharmacy requesting medication refill    Requested Prescriptions     Pending Prescriptions Disp Refills    sertraline (ZOLOFT) 100 mg tablet 90 Tab 0     Sig: Take 1 Tab by mouth daily.  Increased dose     Pharmacy on file verified

## 2018-10-15 ENCOUNTER — OFFICE VISIT (OUTPATIENT)
Dept: FAMILY MEDICINE CLINIC | Age: 28
End: 2018-10-15

## 2018-10-15 VITALS
TEMPERATURE: 98.5 F | HEART RATE: 93 BPM | BODY MASS INDEX: 24.31 KG/M2 | HEIGHT: 64 IN | SYSTOLIC BLOOD PRESSURE: 121 MMHG | RESPIRATION RATE: 16 BRPM | OXYGEN SATURATION: 100 % | WEIGHT: 142.38 LBS | DIASTOLIC BLOOD PRESSURE: 85 MMHG

## 2018-10-15 DIAGNOSIS — I88.9 LYMPHADENITIS: Primary | ICD-10-CM

## 2018-10-15 RX ORDER — LANOLIN ALCOHOL/MO/W.PET/CERES
CREAM (GRAM) TOPICAL
COMMUNITY

## 2018-10-15 NOTE — PROGRESS NOTES
Chief Complaint   Patient presents with    Skin Problem     1. Have you been to the ER, urgent care clinic since your last visit? Hospitalized since your last visit? No    2. Have you seen or consulted any other health care providers outside of the [address] [place] [address] since your last visit? Include any pap smears or colon screening.  No

## 2018-10-15 NOTE — PROGRESS NOTES
HISTORY OF PRESENT ILLNESS  Nico Syed is a 33 y.o. female. HPI  C/o tender mass at base of skull on left side x 2 days. Reports she often has neck and upper back discomfort and sees a chiropractor every 2 weeks. Denies any URI sx.  C/o mild headache today, took tylenol with some sx improvement. Past medical history, social history, family history and medications were reviewed and updated. Blood pressure 121/85, pulse 93, temperature 98.5 °F (36.9 °C), temperature source Oral, resp. rate 16, height 5' 4\" (1.626 m), weight 142 lb 6 oz (64.6 kg), last menstrual period 09/18/2018, SpO2 100 %. Review of Systems   Constitutional: Negative for chills, fever and malaise/fatigue. HENT: Negative for congestion, ear pain and sore throat. Respiratory: Negative. Cardiovascular: Negative. Musculoskeletal: Positive for neck pain. All other systems reviewed and are negative. Physical Exam   Constitutional: No distress. HENT:   Right Ear: Tympanic membrane and ear canal normal.   Left Ear: Tympanic membrane and ear canal normal.   Nose: Right sinus exhibits no maxillary sinus tenderness and no frontal sinus tenderness. Left sinus exhibits no maxillary sinus tenderness and no frontal sinus tenderness. Mouth/Throat: Oropharynx is clear and moist.   Small protruding occipital lymph node palpated. Mildly TTP. No erythema. Neck: Neck supple. Cardiovascular: Normal rate and regular rhythm. Pulmonary/Chest: Effort normal and breath sounds normal.   Lymphadenopathy:     She has no cervical adenopathy. Skin: Skin is warm and dry. No rash noted. ASSESSMENT and PLAN  Diagnoses and all orders for this visit:    1. Lymphadenitis    Inflamed lymph node may be secondary to frequent neck manipulations, recent HA. Recommend ibuprofen 400-600 tid x 3 days. Avoid manipulation. Follow up prn if sx worsen or FTI.

## 2018-12-17 NOTE — TELEPHONE ENCOUNTER
Patient is calling requesting a refill on the medication    . Requested Prescriptions     Pending Prescriptions Disp Refills    ondansetron (ZOFRAN ODT) 4 mg disintegrating tablet 20 Tab 1     Sig: Take 1 Tab by mouth every eight (8) hours as needed for Nausea.        Pharmacy verified      LOV:  Monday, October 15, 2018

## 2018-12-19 RX ORDER — ONDANSETRON 4 MG/1
4 TABLET, ORALLY DISINTEGRATING ORAL
Qty: 20 TAB | Refills: 0 | Status: SHIPPED | OUTPATIENT
Start: 2018-12-19 | End: 2019-03-23 | Stop reason: SDUPTHER

## 2019-03-23 RX ORDER — ONDANSETRON 4 MG/1
4 TABLET, ORALLY DISINTEGRATING ORAL
Qty: 20 TAB | Refills: 0 | Status: SHIPPED | OUTPATIENT
Start: 2019-03-23 | End: 2019-06-11 | Stop reason: SDUPTHER

## 2019-08-07 RX ORDER — ONDANSETRON 4 MG/1
4 TABLET, ORALLY DISINTEGRATING ORAL
Qty: 10 TAB | Refills: 0 | Status: SHIPPED | OUTPATIENT
Start: 2019-08-07 | End: 2019-11-17 | Stop reason: SDUPTHER

## 2019-08-07 NOTE — TELEPHONE ENCOUNTER
PCP: Darcey Babinski, MD    Last appt: 10/15/2018  No future appointments. Requested Prescriptions     Pending Prescriptions Disp Refills    ondansetron (ZOFRAN ODT) 4 mg disintegrating tablet 10 Tab 0     Sig: Take 1 Tab by mouth every eight (8) hours as needed for Nausea.  Needs appointment

## 2019-11-18 ENCOUNTER — OFFICE VISIT (OUTPATIENT)
Dept: FAMILY MEDICINE CLINIC | Age: 29
End: 2019-11-18

## 2019-11-18 VITALS
HEART RATE: 90 BPM | OXYGEN SATURATION: 99 % | WEIGHT: 148 LBS | HEIGHT: 64 IN | RESPIRATION RATE: 18 BRPM | DIASTOLIC BLOOD PRESSURE: 86 MMHG | TEMPERATURE: 98.3 F | SYSTOLIC BLOOD PRESSURE: 118 MMHG | BODY MASS INDEX: 25.27 KG/M2

## 2019-11-18 DIAGNOSIS — B34.9 VIRAL ILLNESS: Primary | ICD-10-CM

## 2019-11-18 DIAGNOSIS — R68.89 FLU-LIKE SYMPTOMS: ICD-10-CM

## 2019-11-18 LAB
QUICKVUE INFLUENZA TEST: NEGATIVE
VALID INTERNAL CONTROL?: YES

## 2019-11-18 RX ORDER — ONDANSETRON 4 MG/1
4 TABLET, ORALLY DISINTEGRATING ORAL
Qty: 10 TAB | Refills: 0 | Status: SHIPPED | OUTPATIENT
Start: 2019-11-18 | End: 2020-01-06 | Stop reason: SDUPTHER

## 2019-11-18 RX ORDER — FAMOTIDINE 20 MG/1
20 TABLET, FILM COATED ORAL 2 TIMES DAILY
COMMUNITY

## 2019-11-18 NOTE — PROGRESS NOTES
Chief Complaint   Patient presents with    Fatigue     with chills, with headache, and generalized body aches x 2 days      1. Have you been to the ER, urgent care clinic since your last visit? Hospitalized since your last visit? No    2. Have you seen or consulted any other health care providers outside of the [address] [place] [address] since your last visit? Include any pap smears or colon screening.  No

## 2019-11-18 NOTE — TELEPHONE ENCOUNTER
PCP: Greg Soni MD    Last appt: 10/15/2018  No future appointments. Requested Prescriptions     Pending Prescriptions Disp Refills    ondansetron (ZOFRAN ODT) 4 mg disintegrating tablet 10 Tab 0     Sig: Take 1 Tab by mouth every eight (8) hours as needed for Nausea.  Needs appointment

## 2019-11-19 NOTE — PROGRESS NOTES
HISTORY OF PRESENT ILLNESS  Jasvir Araujo is a 26 y.o. female. HPI  C/o body aches, headache, mild nausea and dry cough x 3 days. No fever. No known sick contacts. Took some tylenol with temporary sx relief. Past medical history, social history, family history and medications were reviewed and updated. Blood pressure 118/86, pulse 90, temperature 98.3 °F (36.8 °C), temperature source Oral, resp. rate 18, height 5' 4\" (1.626 m), weight 148 lb (67.1 kg), last menstrual period 11/03/2019, SpO2 99 %. Review of Systems   Constitutional: Positive for chills and malaise/fatigue. Negative for fever. HENT: Negative for congestion and sore throat. Respiratory: Positive for cough. Negative for sputum production, shortness of breath and wheezing. Cardiovascular: Negative for chest pain. Gastrointestinal: Positive for nausea. Negative for abdominal pain, constipation, diarrhea and vomiting. Neurological: Positive for headaches (mild). All other systems reviewed and are negative. Physical Exam   Constitutional: She appears well-developed and well-nourished. No distress. HENT:   Right Ear: Tympanic membrane and ear canal normal.   Left Ear: Tympanic membrane and ear canal normal.   Nose: Right sinus exhibits no maxillary sinus tenderness and no frontal sinus tenderness. Left sinus exhibits no maxillary sinus tenderness and no frontal sinus tenderness. Mouth/Throat: Posterior oropharyngeal erythema present. No oropharyngeal exudate or posterior oropharyngeal edema. Neck: Neck supple. Cardiovascular: Normal rate, regular rhythm and normal heart sounds. Pulmonary/Chest: Effort normal and breath sounds normal.   Lymphadenopathy:     She has no cervical adenopathy. Skin: Skin is warm and dry. ASSESSMENT and PLAN  Diagnoses and all orders for this visit:    1. Viral illness  Rest and fluids. May take ibuprofen 400-600mg qid prn body aches/headache.     2. Flu-like symptoms  -     AMB POC RAPID INFLUENZA TEST  Negative. Follow up prn if sx worsen or FTI.

## 2020-01-06 RX ORDER — ONDANSETRON 4 MG/1
4 TABLET, ORALLY DISINTEGRATING ORAL
Qty: 10 TAB | Refills: 0 | Status: SHIPPED | OUTPATIENT
Start: 2020-01-06

## 2020-01-13 ENCOUNTER — TELEPHONE (OUTPATIENT)
Dept: FAMILY MEDICINE CLINIC | Age: 30
End: 2020-01-13

## 2021-01-27 ENCOUNTER — VIRTUAL VISIT (OUTPATIENT)
Dept: FAMILY MEDICINE CLINIC | Age: 31
End: 2021-01-27
Payer: COMMERCIAL

## 2021-01-27 DIAGNOSIS — S16.1XXA STRAIN OF NECK MUSCLE, INITIAL ENCOUNTER: Primary | ICD-10-CM

## 2021-01-27 PROCEDURE — 99213 OFFICE O/P EST LOW 20 MIN: CPT | Performed by: FAMILY MEDICINE

## 2021-01-27 RX ORDER — CYCLOBENZAPRINE HCL 5 MG
5 TABLET ORAL
Qty: 15 TAB | Refills: 0 | Status: SHIPPED | OUTPATIENT
Start: 2021-01-27

## 2021-01-27 RX ORDER — IBUPROFEN 600 MG/1
600 TABLET ORAL
Qty: 20 TAB | Refills: 0 | Status: SHIPPED | OUTPATIENT
Start: 2021-01-27

## 2021-01-27 NOTE — PROGRESS NOTES
Ruslan Tai  31 y.o. female  1990  [address] [facility]  [id number]     [address] [place] [address] PRACTICE       Encounter Date: 1/27/2021           Established Patient Visit Note: Naomy Green MD    Reason for Appointment:  Chief Complaint   Patient presents with    Neck Pain       History of Present Illness:  History provided by patient    Ruslan Tai is a 31 y.o. female who presents today for:    Neck Pain  Duration: 1 week, but worse over the last few days  Location: right posteriolateral aspect  AF: reports doing a strngth workout on Monday that worsened it  Reports that it worsened on Monday and she had pins and needles sensation on right side of neck  Severity: 3-4/10 but 8/10 with turning  She reports that the muscle feel tight and she has pain with turning her head  Medications: she has taken tylenol with some relief, she has also used ice pack and heating pad    Denies any risk for pregnancy    Review of Systems  Review of Systems   Constitutional: Negative for chills and fever. Respiratory: Negative for cough, shortness of breath and wheezing. Cardiovascular: Negative for chest pain and palpitations. Allergies: Patient has no known allergies. Medications: (Updated to reflect final medication list after visit)    Current Outpatient Medications:     cyclobenzaprine (FLEXERIL) 5 mg tablet, Take 1 Tab by mouth three (3) times daily as needed (muscle pain). , Disp: 15 Tab, Rfl: 0    ibuprofen (MOTRIN) 600 mg tablet, Take 1 Tab by mouth every six (6) hours as needed for Pain., Disp: 20 Tab, Rfl: 0    ondansetron (ZOFRAN ODT) 4 mg disintegrating tablet, Take 1 Tab by mouth every eight (8) hours as needed for Nausea.  Needs appointment, Disp: 10 Tab, Rfl: 0    famotidine (PEPCID) 20 mg tablet, Take 20 mg by mouth two (2) times a day., Disp: , Rfl:     VITAMIN B COMPLEX PO, Take  by mouth., Disp: , Rfl:     ferrous sulfate (IRON) 325 mg (65 mg iron) tablet, Take by mouth Daily (before breakfast). , Disp: , Rfl:     sertraline (ZOLOFT) 100 mg tablet, Take 1 Tab by mouth daily. Increased dose, Disp: 90 Tab, Rfl: 0    omeprazole (PRILOSEC) 10 mg capsule, Take 20 mg by mouth daily. , Disp: , Rfl:     diazePAM (VALIUM) 5 mg tablet, Take 5 mg by mouth every six (6) hours as needed for Anxiety. Taking 1/2 tab 2.5 mg tab q night x last 3 nights, Disp: , Rfl:     PRENATAL /IRON/FOLIC ACID (PRENATAL 19 PO), Take  by mouth daily. , Disp: , Rfl:     History  Patient Care Team:  Lucia Cummings MD as PCP - General (Family Medicine)  Lucia Cummings MD as PCP - [facility] Empaneled Provider  Lynn Edmond NP (Family Medicine)  Sri Cleveland MD (Gastroenterology)    Past Medical History: she has a past medical history of Depression, JAY (generalized anxiety disorder), and Generalized headaches. Past Surgical History: she has a past surgical history that includes hx wisdom teeth extraction (2008) and hx endoscopy (05/30/2018). Family Medical History: family history includes Arthritis-rheumatoid in her mother; Colon Cancer in her maternal grandmother; Depression in her father and sister; Hypertension in her paternal grandfather; Stroke in her maternal grandfather and paternal grandfather. Social History: she reports that she has never smoked. She has never used smokeless tobacco. She reports that she does not drink alcohol or use drugs. Objective:     Physical Exam  Constitutional:       Appearance: Normal appearance. She is well-groomed and normal weight. Eyes:      General: Lids are normal. Vision grossly intact. Gaze aligned appropriately. Conjunctiva/sclera:      Right eye: Right conjunctiva is not injected. Left eye: Left conjunctiva is not injected. Neck:      Musculoskeletal: Full passive range of motion without pain and normal range of motion.    Pulmonary:      Effort: Pulmonary effort is normal. No tachypnea, bradypnea, accessory muscle usage or respiratory distress. Skin:     Coloration: Skin is not ashen, cyanotic, jaundiced or mottled. Neurological:      General: No focal deficit present. Mental Status: She is alert. Mental status is at baseline. Psychiatric:         Attention and Perception: Attention and perception normal.         Mood and Affect: Mood and affect normal.         Speech: Speech normal.         Behavior: Behavior normal. Behavior is cooperative. Assessment & Plan:      ICD-10-CM ICD-9-CM    1. Strain of neck muscle, initial encounter  S16. 1XXA 847.0 cyclobenzaprine (FLEXERIL) 5 mg tablet      ibuprofen (MOTRIN) 600 mg tablet     Acute, likely self-limited with treatment. See orders. Consider xray and PT if unresolved with current therapy. I was in the office while conducting this encounter. Consent:  She and/or her healthcare decision maker is aware that this patient-initiated Telehealth encounter is a billable service, with coverage as determined by her insurance carrier. She is aware that she may receive a bill and has provided verbal consent to proceed: Yes    This virtual visit was conducted via [employer]. Pursuant to the emergency declaration under the [facility], [facility]5 waiver authority and the [employer] Act, this Virtual  Visit was conducted to reduce the patient's risk of exposure to COVID-19 and provide continuity of care for an established patient. Services were provided through a video synchronous discussion virtually to substitute for in-person clinic visit. Due to this being a TeleHealth evaluation, many elements of the physical examination are unable to be assessed. Total Time: minutes: 21-30 minutes. I have discussed the diagnosis with the patient and the intended plan as seen in the above orders.   The patient has received an after-visit summary along with patient information handout. I have discussed medication side effects and warnings with the patient as well. Disposition  Follow-up and Dispositions    · Return if symptoms worsen or fail to improve.            Ricky Camp MD

## 2022-03-18 PROBLEM — F41.1 GAD (GENERALIZED ANXIETY DISORDER): Status: ACTIVE | Noted: 2018-01-04

## 2022-03-19 PROBLEM — F33.40: Status: ACTIVE | Noted: 2018-01-04

## 2022-03-19 PROBLEM — L59.0 ERYTHEMA AB IGNE: Status: ACTIVE | Noted: 2018-04-27

## 2022-03-19 PROBLEM — G44.229 CHRONIC TENSION-TYPE HEADACHE, NOT INTRACTABLE: Status: ACTIVE | Noted: 2018-01-04

## 2023-05-16 RX ORDER — IBUPROFEN 600 MG/1
600 TABLET ORAL EVERY 6 HOURS PRN
COMMUNITY
Start: 2021-01-27

## 2023-05-16 RX ORDER — CYCLOBENZAPRINE HCL 5 MG
5 TABLET ORAL 3 TIMES DAILY PRN
COMMUNITY
Start: 2021-01-27

## 2023-05-16 RX ORDER — SERTRALINE HYDROCHLORIDE 100 MG/1
100 TABLET, FILM COATED ORAL DAILY
COMMUNITY
Start: 2018-06-29

## 2023-05-16 RX ORDER — DIAZEPAM 5 MG/1
5 TABLET ORAL EVERY 6 HOURS PRN
COMMUNITY

## 2023-05-16 RX ORDER — ONDANSETRON 4 MG/1
4 TABLET, ORALLY DISINTEGRATING ORAL EVERY 8 HOURS PRN
COMMUNITY
Start: 2020-01-06

## 2023-05-16 RX ORDER — FERROUS SULFATE 325(65) MG
TABLET ORAL
COMMUNITY

## 2023-05-16 RX ORDER — FAMOTIDINE 20 MG/1
20 TABLET, FILM COATED ORAL 2 TIMES DAILY
COMMUNITY

## 2023-05-16 RX ORDER — OMEPRAZOLE 10 MG/1
20 CAPSULE, DELAYED RELEASE ORAL DAILY
COMMUNITY

## 2025-02-18 ENCOUNTER — OFFICE VISIT (OUTPATIENT)
Age: 35
End: 2025-02-18

## 2025-02-18 VITALS
OXYGEN SATURATION: 96 % | WEIGHT: 174.6 LBS | SYSTOLIC BLOOD PRESSURE: 114 MMHG | TEMPERATURE: 98.4 F | BODY MASS INDEX: 29.81 KG/M2 | HEIGHT: 64 IN | HEART RATE: 122 BPM | DIASTOLIC BLOOD PRESSURE: 72 MMHG

## 2025-02-18 DIAGNOSIS — R68.89 FLU-LIKE SYMPTOMS: Primary | ICD-10-CM

## 2025-02-18 LAB
INFLUENZA A ANTIGEN, POC: NEGATIVE
INFLUENZA B ANTIGEN, POC: NEGATIVE
Lab: NORMAL
PERFORMING INSTRUMENT: NORMAL
QC PASS/FAIL: NORMAL
SARS-COV-2, POC: NORMAL

## 2025-02-18 RX ORDER — DIPHENHYDRAMINE HCL 25 MG
25 CAPSULE ORAL EVERY 6 HOURS PRN
COMMUNITY

## 2025-02-18 NOTE — PATIENT INSTRUCTIONS
Thank you for visiting [facility] today.    -Tylenol/Ibuprofen for pain/fever  -Throat lozenges or throat sprays may help with discomfort  -Salt water gargles with 1/2 teaspoon to 1 teaspoon of Benadryl  -Soft, cold foods may soothe your throat as well as ice chips  -Increase humidity in house  -Zyrtec-D or Claritin-D for congestion    If you begin to have worsening pain, uncontrollable fever greater than 100.4 or difficulty swallowing, please go to the ER.

## 2025-02-18 NOTE — PROGRESS NOTES
immediate return to the urgent care or emergency department.  Patient/Guardian expressed understanding and agrees with the discharge plan.  No further questions at time of discharge.    Brittany Carlos, APRN - CNP

## 2025-02-20 PROBLEM — D64.9 ANEMIA: Status: ACTIVE | Noted: 2025-02-20

## 2025-02-20 PROBLEM — Z34.00 PRIMIGRAVIDA: Status: ACTIVE | Noted: 2025-02-20

## 2025-02-20 PROBLEM — Z67.91 RHD NEGATIVE: Status: ACTIVE | Noted: 2025-02-20

## 2025-05-09 ENCOUNTER — OFFICE VISIT (OUTPATIENT)
Age: 35
End: 2025-05-09

## 2025-05-09 VITALS
TEMPERATURE: 97.9 F | DIASTOLIC BLOOD PRESSURE: 70 MMHG | RESPIRATION RATE: 16 BRPM | WEIGHT: 177 LBS | OXYGEN SATURATION: 100 % | HEART RATE: 70 BPM | SYSTOLIC BLOOD PRESSURE: 103 MMHG | BODY MASS INDEX: 30.38 KG/M2

## 2025-05-09 DIAGNOSIS — L08.9 SUPERFICIAL BACTERIAL SKIN INFECTION: Primary | ICD-10-CM

## 2025-05-09 DIAGNOSIS — H65.03 NON-RECURRENT ACUTE SEROUS OTITIS MEDIA OF BOTH EARS: ICD-10-CM

## 2025-05-09 DIAGNOSIS — B96.89 SUPERFICIAL BACTERIAL SKIN INFECTION: Primary | ICD-10-CM

## 2025-05-09 RX ORDER — MUPIROCIN 20 MG/G
OINTMENT TOPICAL
Qty: 15 G | Refills: 0 | Status: SHIPPED | OUTPATIENT
Start: 2025-05-09 | End: 2025-05-16

## 2025-05-09 NOTE — PATIENT INSTRUCTIONS
Patient was seen today for evaluation of bilateral ear discomfort and fullness  There is a small crusty area on the right earlobe which is consistent with a superficial staphylococcal skin infection  Will treat with mupirocin, 3 times daily for the next 5 to 7 days  Keep the area clean and avoid scratching to prevent the spread of bacteria  There is no evidence of acute otitis media or acute otitis externa  There does appear to be some fluid behind the eardrums suggestive of a serous otitis  This is often self-limited and will resolve on its own  Try taking daily Zyrtec plus Flonase, 2 sprays in each nostril daily for the next 1 to 2 weeks to help  You may also try a daily decongestant, like Sudafed  Please monitor symptoms, if symptoms persist or worsen, please return for reevaluation

## 2025-05-09 NOTE — PROGRESS NOTES
Nika Thakkar (:  1990) is a 34 y.o. female,Established patient, here for evaluation of the following chief complaint(s):  Ear Pain (Bilateral ear ache, onset yesterday. Feels like there's fluid in her LT ear. Also reports of some sinus congestion. No fever. )      Assessment & Plan :  Visit Diagnoses and Associated Orders         Superficial bacterial skin infection    -  Primary    mupirocin (BACTROBAN) 2 % ointment [[id number]]             Non-recurrent acute serous otitis media of both ears                     Patient was seen today for evaluation of bilateral ear discomfort and fullness  There is a small crusty area on the right earlobe which is consistent with a superficial staphylococcal skin infection  Will treat with mupirocin, 3 times daily for the next 5 to 7 days  Keep the area clean and avoid scratching to prevent the spread of bacteria  There is no evidence of acute otitis media or acute otitis externa  There does appear to be some fluid behind the eardrums suggestive of a serous otitis  This is often self-limited and will resolve on its own  Try taking daily Zyrtec plus Flonase, 2 sprays in each nostril daily for the next 1 to 2 weeks to help  You may also try a daily decongestant, like Sudafed  Please monitor symptoms, if symptoms persist or worsen, please return for reevaluation       Subjective :    Ear Pain          34 y.o. female presents with symptoms of several days of bilateral ear discomfort.  She states symptoms began on the right ear and are slowly spreading to the left.  She denies significant pain, and instead reports a bilateral feeling of fullness and discomfort.  She does report perhaps slightly diminished hearing.  Denies any recent illnesses or infections.  No fevers, chills, body aches or fatigue.  She does report just mild nasal congestion, and though she denies any history of chronic or severe allergic rhinitis, does note some issues with this over the past few weeks.

## 2025-06-17 ENCOUNTER — OFFICE VISIT (OUTPATIENT)
Age: 35
End: 2025-06-17
Payer: COMMERCIAL

## 2025-06-17 VITALS
RESPIRATION RATE: 20 BRPM | SYSTOLIC BLOOD PRESSURE: 91 MMHG | OXYGEN SATURATION: 98 % | DIASTOLIC BLOOD PRESSURE: 63 MMHG | TEMPERATURE: 97.5 F | WEIGHT: 175.4 LBS | BODY MASS INDEX: 29.94 KG/M2 | HEIGHT: 64 IN | HEART RATE: 100 BPM

## 2025-06-17 DIAGNOSIS — Z00.00 ROUTINE GENERAL MEDICAL EXAMINATION AT A HEALTH CARE FACILITY: Primary | ICD-10-CM

## 2025-06-17 DIAGNOSIS — Z86.2 HISTORY OF ANEMIA: ICD-10-CM

## 2025-06-17 DIAGNOSIS — Z13.220 SCREENING, LIPID: ICD-10-CM

## 2025-06-17 DIAGNOSIS — Z13.1 SCREENING FOR DIABETES MELLITUS: ICD-10-CM

## 2025-06-17 DIAGNOSIS — N64.52 NIPPLE DISCHARGE: ICD-10-CM

## 2025-06-17 DIAGNOSIS — R53.83 OTHER FATIGUE: ICD-10-CM

## 2025-06-17 PROCEDURE — 99395 PREV VISIT EST AGE 18-39: CPT | Performed by: NURSE PRACTITIONER

## 2025-06-17 RX ORDER — MULTIVITAMIN WITH IRON
1 TABLET ORAL DAILY
COMMUNITY

## 2025-06-17 SDOH — ECONOMIC STABILITY: FOOD INSECURITY: WITHIN THE PAST 12 MONTHS, YOU WORRIED THAT YOUR FOOD WOULD RUN OUT BEFORE YOU GOT MONEY TO BUY MORE.: NEVER TRUE

## 2025-06-17 SDOH — ECONOMIC STABILITY: FOOD INSECURITY: WITHIN THE PAST 12 MONTHS, THE FOOD YOU BOUGHT JUST DIDN'T LAST AND YOU DIDN'T HAVE MONEY TO GET MORE.: NEVER TRUE

## 2025-06-17 ASSESSMENT — PATIENT HEALTH QUESTIONNAIRE - PHQ9: DEPRESSION UNABLE TO ASSESS: PT REFUSES

## 2025-06-17 ASSESSMENT — ENCOUNTER SYMPTOMS
EYE PAIN: 0
CONSTIPATION: 0
DIARRHEA: 0
SHORTNESS OF BREATH: 0
COUGH: 0
ABDOMINAL PAIN: 0
BLOOD IN STOOL: 0

## 2025-06-17 NOTE — PATIENT INSTRUCTIONS
Dr. Apodaca     Our Location  [address] [facility] [address] [place] [id number]  Call for Appointment  [phone]

## 2025-06-17 NOTE — PROGRESS NOTES
Chief Complaint   Patient presents with    New Patient    Weight Loss    Sweats    Fatigue    Shoulder Pain    Anxiety         \"Have you been to the ER, urgent care clinic since your last visit?  Hospitalized since your last visit?\"    NO    “Have you seen or consulted any other health care providers outside of [facility] since your last visit?”    NO     “Have you had a pap smear?”    Yes [facility]    No cervical cancer screening on file             Click Here for Release of Records Request           6/17/2025    11:24 AM   PHQ-9    Depression Unable to Assess Pt refuses           Financial Resource Strain: Not on file      Food Insecurity: No Food Insecurity (6/17/2025)    Hunger Vital Sign     Worried About Running Out of Food in the Last Year: Never true     Ran Out of Food in the Last Year: Never true          Health Maintenance Due   Topic Date Due    Polio vaccine (2 of 3 - 4-dose series) 01/29/1993    Hepatitis B vaccine (2 of 3 - 3-dose series) 01/29/2001    Depression Monitoring  Never done    Varicella vaccine (1 of 2 - 13+ 2-dose series) Never done    HIV screen  Never done    Hepatitis C screen  Never done    HPV vaccine (3 - 3-dose series) 08/30/2016    Cervical cancer screen  Never done    Diabetes screen  Never done    COVID-19 Vaccine (2 - 2024-25 season) 09/01/2024

## 2025-06-17 NOTE — PROGRESS NOTES
HPI:   Nika Thakkar is a 35 y.o. female who presents to establish care.    She is .  She has one daughter who is 20 months old.      She is followed by psychiatry, Trupti Angel, for a history of anxiety.      She is interested in weight loss.  She would like to lose 20 pounds.  She is starting to work with a weight loss .      She is having occasional milky discharge from her nipples since she stopped breastfeeding 1 year ago.      She is dealing with ongoing fatigue.     Current Outpatient Medications   Medication Sig Dispense Refill    Multiple Vitamin (MULTIVITAMIN) TABS tablet Take 1 tablet by mouth daily      Omega-3 Fatty Acids (FISH OIL) 300 MG CAPS Take by mouth      VITAMIN D PO Take by mouth      MAGNESIUM GLYCINATE PO Take by mouth      B Complex Vitamins (VITAMIN B COMPLEX PO) Take by mouth      diazePAM (VALIUM) 5 MG tablet Take 1 tablet by mouth every 6 hours as needed.      famotidine (PEPCID) 20 MG tablet Take 1 tablet by mouth 2 times daily      ferrous sulfate (IRON 325) 325 (65 Fe) MG tablet Take by mouth every morning (before breakfast)      ondansetron (ZOFRAN-ODT) 4 MG disintegrating tablet Take 1 tablet by mouth every 8 hours as needed      sertraline (ZOLOFT) 100 MG tablet Take 1 tablet by mouth daily       No current facility-administered medications for this visit.      No Known Allergies   Patient Active Problem List   Diagnosis    Actinic keratosis    Motion sickness    Equilibrium disorder    Persistent disorder of initiating or maintaining sleep    CATALINA (generalized anxiety disorder)    Recurrent depressive disorder, currently in remission    Erythema ab igne    Chronic tension-type headache, not intractable    Anemia    Breech presentation    Family history of breast cancer    Family history of malignant neoplasm of colon    Family history of malignant neoplasm of ovary    Primigravida    RhD negative     Past Medical History:   Diagnosis Date     delivery delivered

## 2025-06-19 ENCOUNTER — LAB (OUTPATIENT)
Age: 35
End: 2025-06-19

## 2025-06-19 DIAGNOSIS — Z13.1 SCREENING FOR DIABETES MELLITUS: ICD-10-CM

## 2025-06-19 DIAGNOSIS — N64.52 NIPPLE DISCHARGE: ICD-10-CM

## 2025-06-19 DIAGNOSIS — Z86.2 HISTORY OF ANEMIA: ICD-10-CM

## 2025-06-19 DIAGNOSIS — R53.83 OTHER FATIGUE: ICD-10-CM

## 2025-06-19 DIAGNOSIS — Z13.220 SCREENING, LIPID: ICD-10-CM

## 2025-06-19 DIAGNOSIS — Z00.00 ROUTINE GENERAL MEDICAL EXAMINATION AT A HEALTH CARE FACILITY: ICD-10-CM

## 2025-06-21 LAB
25(OH)D3 SERPL-MCNC: 35.2 NG/ML (ref 30–100)
ALBUMIN SERPL-MCNC: 4.2 G/DL (ref 3.5–5)
ALBUMIN/GLOB SERPL: 1.2 (ref 1.1–2.2)
ALP SERPL-CCNC: 81 U/L (ref 45–117)
ALT SERPL-CCNC: 23 U/L (ref 12–78)
ANION GAP SERPL CALC-SCNC: 5 MMOL/L (ref 2–12)
AST SERPL-CCNC: 17 U/L (ref 15–37)
BASOPHILS # BLD: 0.05 K/UL (ref 0–0.1)
BASOPHILS NFR BLD: 0.8 % (ref 0–1)
BILIRUB SERPL-MCNC: 0.4 MG/DL (ref 0.2–1)
BUN SERPL-MCNC: 20 MG/DL (ref 6–20)
BUN/CREAT SERPL: 21 (ref 12–20)
CALCIUM SERPL-MCNC: 9.3 MG/DL (ref 8.5–10.1)
CHLORIDE SERPL-SCNC: 103 MMOL/L (ref 97–108)
CHOLEST SERPL-MCNC: 145 MG/DL
CO2 SERPL-SCNC: 28 MMOL/L (ref 21–32)
CREAT SERPL-MCNC: 0.95 MG/DL (ref 0.55–1.02)
DIFFERENTIAL METHOD BLD: NORMAL
EOSINOPHIL # BLD: 0.09 K/UL (ref 0–0.4)
EOSINOPHIL NFR BLD: 1.4 % (ref 0–7)
ERYTHROCYTE [DISTWIDTH] IN BLOOD BY AUTOMATED COUNT: 12.2 % (ref 11.5–14.5)
EST. AVERAGE GLUCOSE BLD GHB EST-MCNC: 100 MG/DL
FSH SERPL-ACNC: 9.1 MIU/ML
GLOBULIN SER CALC-MCNC: 3.5 G/DL (ref 2–4)
GLUCOSE SERPL-MCNC: 87 MG/DL (ref 65–100)
HBA1C MFR BLD: 5.1 % (ref 4–5.6)
HCT VFR BLD AUTO: 44.3 % (ref 35–47)
HDLC SERPL-MCNC: 46 MG/DL
HDLC SERPL: 3.2 (ref 0–5)
HGB BLD-MCNC: 13.8 G/DL (ref 11.5–16)
IMM GRANULOCYTES # BLD AUTO: 0.01 K/UL (ref 0–0.04)
IMM GRANULOCYTES NFR BLD AUTO: 0.2 % (ref 0–0.5)
IRON SATN MFR SERPL: 18 % (ref 20–50)
IRON SERPL-MCNC: 64 UG/DL (ref 35–150)
LDLC SERPL CALC-MCNC: 59.4 MG/DL (ref 0–100)
LH SERPL-ACNC: 7.3 MIU/ML
LYMPHOCYTES # BLD: 1.49 K/UL (ref 0.8–3.5)
LYMPHOCYTES NFR BLD: 23.9 % (ref 12–49)
MCH RBC QN AUTO: 29.2 PG (ref 26–34)
MCHC RBC AUTO-ENTMCNC: 31.2 G/DL (ref 30–36.5)
MCV RBC AUTO: 93.7 FL (ref 80–99)
MONOCYTES # BLD: 0.51 K/UL (ref 0–1)
MONOCYTES NFR BLD: 8.2 % (ref 5–13)
NEUTS SEG # BLD: 4.08 K/UL (ref 1.8–8)
NEUTS SEG NFR BLD: 65.5 % (ref 32–75)
NRBC # BLD: 0 K/UL (ref 0–0.01)
NRBC BLD-RTO: 0 PER 100 WBC
PLATELET # BLD AUTO: 182 K/UL (ref 150–400)
PMV BLD AUTO: 12.4 FL (ref 8.9–12.9)
POTASSIUM SERPL-SCNC: 4.3 MMOL/L (ref 3.5–5.1)
PROLACTIN SERPL-MCNC: 5.4 NG/ML
PROT SERPL-MCNC: 7.7 G/DL (ref 6.4–8.2)
RBC # BLD AUTO: 4.73 M/UL (ref 3.8–5.2)
SODIUM SERPL-SCNC: 136 MMOL/L (ref 136–145)
TIBC SERPL-MCNC: 363 UG/DL (ref 250–450)
TRIGL SERPL-MCNC: 198 MG/DL
TSH SERPL DL<=0.05 MIU/L-ACNC: 0.93 UIU/ML (ref 0.36–3.74)
VIT B12 SERPL-MCNC: 638 PG/ML (ref 193–986)
VLDLC SERPL CALC-MCNC: 39.6 MG/DL
WBC # BLD AUTO: 6.2 K/UL (ref 3.6–11)

## 2025-06-24 LAB — ESTROGEN SERPL-MCNC: 140 PG/ML

## 2025-06-25 ENCOUNTER — RESULTS FOLLOW-UP (OUTPATIENT)
Age: 35
End: 2025-06-25